# Patient Record
Sex: MALE | Race: WHITE | NOT HISPANIC OR LATINO | Employment: FULL TIME | URBAN - METROPOLITAN AREA
[De-identification: names, ages, dates, MRNs, and addresses within clinical notes are randomized per-mention and may not be internally consistent; named-entity substitution may affect disease eponyms.]

---

## 2017-10-23 ENCOUNTER — TRANSFERRED RECORDS (OUTPATIENT)
Dept: MULTI SPECIALTY CLINIC | Facility: CLINIC | Age: 52
End: 2017-10-23

## 2021-05-21 ENCOUNTER — OFFICE VISIT (OUTPATIENT)
Dept: FAMILY MEDICINE | Facility: CLINIC | Age: 56
End: 2021-05-21
Payer: COMMERCIAL

## 2021-05-21 VITALS
HEART RATE: 90 BPM | DIASTOLIC BLOOD PRESSURE: 78 MMHG | BODY MASS INDEX: 29.35 KG/M2 | OXYGEN SATURATION: 97 % | HEIGHT: 70 IN | WEIGHT: 205 LBS | TEMPERATURE: 98.2 F | SYSTOLIC BLOOD PRESSURE: 131 MMHG

## 2021-05-21 DIAGNOSIS — R79.89 LOW TESTOSTERONE: ICD-10-CM

## 2021-05-21 DIAGNOSIS — B00.1 HERPES LABIALIS: ICD-10-CM

## 2021-05-21 DIAGNOSIS — N40.0 BENIGN PROSTATIC HYPERPLASIA, UNSPECIFIED WHETHER LOWER URINARY TRACT SYMPTOMS PRESENT: ICD-10-CM

## 2021-05-21 DIAGNOSIS — L40.50 PSORIATIC ARTHRITIS (H): Primary | ICD-10-CM

## 2021-05-21 DIAGNOSIS — G47.00 INSOMNIA, UNSPECIFIED TYPE: ICD-10-CM

## 2021-05-21 DIAGNOSIS — G89.4 CHRONIC PAIN SYNDROME: ICD-10-CM

## 2021-05-21 PROCEDURE — 99203 OFFICE O/P NEW LOW 30 MIN: CPT | Performed by: FAMILY MEDICINE

## 2021-05-21 RX ORDER — TAMSULOSIN HYDROCHLORIDE 0.4 MG/1
CAPSULE ORAL
COMMUNITY

## 2021-05-21 RX ORDER — ASPIRIN 81 MG/1
81 TABLET ORAL DAILY
COMMUNITY

## 2021-05-21 RX ORDER — VALACYCLOVIR HYDROCHLORIDE 500 MG/1
TABLET, FILM COATED ORAL
COMMUNITY

## 2021-05-21 RX ORDER — TESTOSTERONE CYPIONATE 200 MG/ML
INJECTION, SOLUTION INTRAMUSCULAR
COMMUNITY

## 2021-05-21 RX ORDER — HYDROCODONE BITARTRATE AND ACETAMINOPHEN 5; 325 MG/1; MG/1
TABLET ORAL
COMMUNITY

## 2021-05-21 RX ORDER — LIDOCAINE 50 MG/G
PATCH TOPICAL
COMMUNITY

## 2021-05-21 RX ORDER — LORAZEPAM 0.5 MG/1
TABLET ORAL
COMMUNITY
End: 2023-08-25

## 2021-05-21 ASSESSMENT — MIFFLIN-ST. JEOR: SCORE: 1754.25

## 2021-05-21 NOTE — Clinical Note
Patient had a normal colonoscopy on October 23 of 2017 with results in his chart from [facility].  Repeat exam was recommended in 10 years. Please place in [facility].

## 2022-05-19 NOTE — PROGRESS NOTES
Assessment & Plan       ICD-10-CM    1. Psoriatic arthritis (H)  L40.50    2. Chronic pain syndrome  G89.4    3. Insomnia, unspecified type  G47.00    4. Low testosterone  R79.89    5. Benign prostatic hyperplasia, unspecified whether lower urinary tract symptoms present  N40.0    6. Herpes labialis  B00.1      We discussed his health conditions above  Discussed that he is on three different controlled substances including hydrocodone, lorazepam, and testosterone  I told him that I did not like to prescribe chronic opioids for people with musculoskeletal pain and especially did not like the combination of an opioid and benzodiazepine together  I also typically have urology manage a low testosterone situation and the testosterone injections  He apparently has been getting these medicines from his PCP and would like to continue to have a PCP manage these, so we decided he would likely seek out care from a different PCP    Return for a follow up as needed/desired.    Dean Unger MD  [facility] KODY Brennan is a 56 year old who presents for the following health issues     HPI     New Patient/Transfer of Care.    The patient was living in [place] and had a primary care provider and her rheumatologist there.  He lives in [place] now and his PCP practices out of [place], so he thought he would try to find a closer PCP.  He relates a history of psoriatic arthritis.  He sees a rheumatologist for that and is on Enbrel injections.  He has chronic pain and takes Norco three times a day for that.  He has had trouble sleeping and was on trazodone for a while, but that caused some hangover side effects, so he has been taking lorazepam at night to help with sleep instead.  He is on testosterone injections for a history of low testosterone.  He takes valacyclovir for cold sores and tamsulosin for urinary obstructive symptoms.    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 Significant observations if any: none. Removal took a short amount of time  and was not difficult at all.  Patient tolerated the procedure well.   "amount of physical labor that wears him out and causes pain.    Patient Active Problem List   Diagnosis     Psoriatic arthritis (H)     Chronic pain syndrome     Insomnia, unspecified type     Low testosterone     Benign prostatic hyperplasia, unspecified whether lower urinary tract symptoms present     Herpes labialis      Current Outpatient Medications   Medication     aspirin 81 MG EC tablet     etanercept (ENBREL SURECLICK) 50 MG/ML autoinjector     HYDROcodone-acetaminophen (NORCO) 5-325 MG tablet     lidocaine (LIDODERM) 5 % patch     LORazepam (ATIVAN) 0.5 MG tablet     LUER LOCK SAFETY SYRINGES 25G X 1\" 3 ML MISC     tamsulosin (FLOMAX) 0.4 MG capsule     testosterone cypionate (DEPOTESTOSTERONE) 200 MG/ML injection     valACYclovir (VALTREX) 500 MG tablet     No current facility-administered medications for this visit.          Review of Systems   Mainly significant for the above.      Objective    /78 (BP Location: Right arm, Patient Position: Sitting, Cuff Size: Adult Regular)   Pulse 90   Temp 98.2  F (36.8  C) (Oral)   Ht 1.775 m (5' 9.88\")   Wt 93 kg (205 lb)   SpO2 97%   BMI 29.51 kg/m    Body mass index is 29.51 kg/m .  Physical Exam   GENERAL: alert, no distress and over weight  MS: he is missing part of the fingers of his left hand    Old records were reviewed that we have on him from care in the [facility], but we do not have records from the [facility]        "

## 2023-07-10 ENCOUNTER — TRANSFERRED RECORDS (OUTPATIENT)
Dept: HEALTH INFORMATION MANAGEMENT | Facility: CLINIC | Age: 58
End: 2023-07-10
Payer: COMMERCIAL

## 2023-07-10 LAB
ALT SERPL-CCNC: <7 U/L (ref 18–65)
AST SERPL-CCNC: <8 U/L (ref 10–40)
CHOLESTEROL (EXTERNAL): 144 MG/DL
CREATININE (EXTERNAL): 0.81 MG/DL (ref 0.8–1.5)
GFR ESTIMATED (EXTERNAL): >90 ML/MIN/1.73M2
GLUCOSE (EXTERNAL): 99 MG/DL (ref 60–99)
HDLC SERPL-MCNC: 49 MG/DL
LDL CHOLESTEROL CALCULATED (EXTERNAL): 74 MG/DL
NON HDL CHOLESTEROL (EXTERNAL): 95 MG/DL
POTASSIUM (EXTERNAL): 4.3 MMOL/L (ref 3.5–5.1)
TRIGLYCERIDES (EXTERNAL): 105 MG/DL

## 2023-07-31 ENCOUNTER — MEDICAL CORRESPONDENCE (OUTPATIENT)
Dept: HEALTH INFORMATION MANAGEMENT | Facility: CLINIC | Age: 58
End: 2023-07-31
Payer: COMMERCIAL

## 2023-08-02 ENCOUNTER — TRANSCRIBE ORDERS (OUTPATIENT)
Dept: OTHER | Age: 58
End: 2023-08-02

## 2023-08-02 DIAGNOSIS — R79.89 OTHER SPECIFIED ABNORMAL FINDINGS OF BLOOD CHEMISTRY: ICD-10-CM

## 2023-08-02 DIAGNOSIS — R93.2 ABNORMAL FINDINGS ON DIAGNOSTIC IMAGING OF LIVER AND BILIARY TRACT: Primary | ICD-10-CM

## 2023-08-02 DIAGNOSIS — R17 UNSPECIFIED JAUNDICE: ICD-10-CM

## 2023-08-04 NOTE — CONFIDENTIAL NOTE
DIAGNOSIS:    Abnormal findings on diagnostic imaging of liver and biliary tract   Other specified abnormal findings of blood chemistry   Unspecified jaundice      Appt Date:  09.29.2023   NOTES STATUS DETAILS   OFFICE NOTE from referring provider Received 08.03.2023 JOSE A Connell CNP    OFFICE NOTES from other specialists     DISCHARGE SUMMARY from hospital     MEDICATION LIST Care Everywhere    LIVER BIOSPY (IF APPLICABLE)      PATHOLOGY REPORTS      IMAGING     ENDOSCOPY (IF AVAILABLE)     COLONOSCOPY (IF AVAILABLE)     ULTRASOUND LIVER     CT OF ABDOMEN     MRI OF LIVER     FIBROSCAN, US ELASTOGRAPHY, FIBROSIS SCAN, MR ELASTOGRAPHY     LABS     HEPATIC PANEL (LIVER PANEL) Received 07.10.2023   BASIC METABOLIC PANEL Received 07.10.2023   COMPLETE METABOLIC PANEL Received 07.10.2023   COMPLETE BLOOD COUNT (CBC) Received 07.10.2023   INTERNATIONAL NORMALIZED RATIO (INR)     HEPATITIS C ANTIBODY     HEPATITIS C VIRAL LOAD/PCR     HEPATITIS C GENOTYPE     HEPATITIS B SURFACE ANTIGEN     HEPATITIS B SURFACE ANTIBODY     HEPATITIS B DNA QUANT LEVEL     HEPATITIS B CORE ANTIBODY

## 2023-08-06 ENCOUNTER — HEALTH MAINTENANCE LETTER (OUTPATIENT)
Age: 58
End: 2023-08-06

## 2023-08-25 ENCOUNTER — VIRTUAL VISIT (OUTPATIENT)
Dept: GASTROENTEROLOGY | Facility: CLINIC | Age: 58
End: 2023-08-25
Attending: STUDENT IN AN ORGANIZED HEALTH CARE EDUCATION/TRAINING PROGRAM
Payer: COMMERCIAL

## 2023-08-25 DIAGNOSIS — R93.2 ABNORMAL FINDINGS ON DIAGNOSTIC IMAGING OF LIVER AND BILIARY TRACT: ICD-10-CM

## 2023-08-25 DIAGNOSIS — R79.89 OTHER SPECIFIED ABNORMAL FINDINGS OF BLOOD CHEMISTRY: ICD-10-CM

## 2023-08-25 DIAGNOSIS — R17 UNSPECIFIED JAUNDICE: ICD-10-CM

## 2023-08-25 PROCEDURE — 99204 OFFICE O/P NEW MOD 45 MIN: CPT | Mod: VID | Performed by: STUDENT IN AN ORGANIZED HEALTH CARE EDUCATION/TRAINING PROGRAM

## 2023-08-25 RX ORDER — ATORVASTATIN CALCIUM 20 MG/1
20 TABLET, FILM COATED ORAL DAILY
COMMUNITY
Start: 2023-07-17

## 2023-08-25 NOTE — NURSING NOTE
Is the patient currently in the state of [place]? YES    Visit mode:VIDEO    If the visit is dropped, the patient can be reconnected by: VIDEO VISIT: Send to e-mail at: [email or web address]    Will anyone else be joining the visit? NO  (If patient encounters technical issues they should call [phone]    How would you like to obtain your AVS? MyChart    Are changes needed to the allergy or medication list? No    Reason for visit: Consult    Clayton BREWER

## 2023-08-25 NOTE — PROGRESS NOTES
"Virtual Visit Details    Type of service:  Video Visit     Originating Location (pt. Location): Home    Distant Location (provider location):  Off-site  Platform used for Video Visit: Den  Start Time 9:01 AM  End Time 9:28 AM    [facility] New Patient Visit    Date of Visit: August 25, 2023    Reason for referral: ?jaundice, abnormal liver imaging    Subjective: Mr. Reeder is a 58 year old man with a history of psoriatic arthritis who presents for evaluation of ? Elevated LFTs and cirrhosis. Unfortunately, I have no records for review.     He had an ultrasound that showed \"fatty liver\" and maybe scarring. He had labs that were apparently better than expected given the US findings    Currently weighs 205 lbs, has been around this for awhile, but did gain weight in 2021 when going through personal stressors    Used to be a heavy drinker, but has not drank for 30 years.     No previous known liver disease, abnormal LFTs, imaging tests. No known history of liver decompensation    On PO methotrexate for 1-2 months in the past.     Thinks hepatitis C and B testing was done    ROS: 14 point ROS negative except for positives noted in HPI.    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  Bunionectomy  Appendectomy    FamHx:  No family history of liver disease, liver cancer    SocHx:  Social History     Socioeconomic History    Marital status:      Spouse name: Not on file    Number of children: Not on file    Years of education: Not on file    Highest education level: Not on file   Occupational History    Not on file   Tobacco Use    Smoking status: Former    Smokeless tobacco: Never   Substance and Sexual Activity    Alcohol use: Not Currently    Drug use: Not Currently    Sexual activity: Yes     Partners: Female   Other Topics Concern    Not " "on file   Social History Narrative    Not on file     Social Determinants of Health     Financial Resource Strain: Not on file   Food Insecurity: Not on file   Transportation Needs: Not on file   Physical Activity: Not on file   Stress: Not on file   Social Connections: Not on file   Intimate Partner Violence: Not on file   Housing Stability: Not on file   Smokes tobacco    Medications:  Current Outpatient Medications   Medication    aspirin 81 MG EC tablet    etanercept (ENBREL SURECLICK) 50 MG/ML autoinjector    HYDROcodone-acetaminophen (NORCO) 5-325 MG tablet    lidocaine (LIDODERM) 5 % patch    LORazepam (ATIVAN) 0.5 MG tablet    LUER LOCK SAFETY SYRINGES 25G X 1\" 3 ML MISC    tamsulosin (FLOMAX) 0.4 MG capsule    testosterone cypionate (DEPOTESTOSTERONE) 200 MG/ML injection    valACYclovir (VALTREX) 500 MG tablet     No current facility-administered medications for this visit.     No OTCs, herbals    Allergies:  No Known Allergies    Objective:  There were no vitals taken for this visit.  Constitutional: pleasant man in NAD  Eyes: non icteric  Respiratory: Normal respiratory excursion   MSK: normal range of motion of visualized extremities  Abd: Non distended  Skin: No jaundice  Psychiatric: normal mood and orientation    Labs:    No recent labs for review    Platelets 275    Imaging: None for review    Independently reviewed labs and imaging.     Assessment/Plan: Mr. Reeder is a 58 year old man with a history of psoriatic arthritis who presents for evaluation of apparent liver scarring and elevated LFTs. Unfortunately, I do not have any records for review.     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  Discussed this is likely secondary to metabolic associated steatotic liver disease.  Treatment for this is weight loss to reduce liver inflammation.  In patients who have cirrhosis, it is unlikely the fibrosis will regress with weight loss.  He does not drink alcohol " much, recommend he completely abstain.  We will make sure he has had work-up for other causes of chronic liver disease, including autoimmune etiologies given his psoriatic arthritis.  Discussed that patients with cirrhosis need liver cancer screening with ultrasound and labs every 6 months.  Also recommend upper endoscopy for variceal screening if his imaging is consistent with cirrhosis.  Do not think his Enbrel is related to his underlying liver issues.      RTC 6 months.    Payton Moore MD MS  Hepatology/Liver Transplant  [facility]

## 2023-08-25 NOTE — LETTER
"    8/25/2023         RE: Mika Reeder  [address] [address]        Dear Colleague,    Thank you for referring your patient, Mika Reeder, to the [facility] [place]. Please see a copy of my visit note below.    Virtual Visit Details    Type of service:  Video Visit     Originating Location (pt. Location): Home    Distant Location (provider location):  Off-site  Platform used for Video Visit: [employer]  Start Time 9:01 AM  End Time 9:28 AM    [facility] New Patient Visit    Date of Visit: August 25, 2023    Reason for referral: ?jaundice, abnormal liver imaging    Subjective: Mr. Reeder is a 58 year old man with a history of psoriatic arthritis who presents for evaluation of ? Elevated LFTs and cirrhosis. Unfortunately, I have no records for review.     He had an ultrasound that showed \"fatty liver\" and maybe scarring. He had labs that were apparently better than expected given the US findings    Currently weighs 205 lbs, has been around this for awhile, but did gain weight in 2021 when going through personal stressors    Used to be a heavy drinker, but has not drank for 30 years.     No previous known liver disease, abnormal LFTs, imaging tests. No known history of liver decompensation    On PO methotrexate for 1-2 months in the past.     Thinks hepatitis C and B testing was done    ROS: 14 point ROS negative except for positives noted in HPI.    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  Bunionectomy  Appendectomy    FamHx:  No family history of liver disease, liver cancer    SocHx:  Social History     Socioeconomic History    Marital status:      Spouse name: Not on file    Number of children: Not on file    Years of education: Not on file    Highest education level: Not on file " "  Occupational History    Not on file   Tobacco Use    Smoking status: Former    Smokeless tobacco: Never   Substance and Sexual Activity    Alcohol use: Not Currently    Drug use: Not Currently    Sexual activity: Yes     Partners: Female   Other Topics Concern    Not on file   Social History Narrative    Not on file     Social Determinants of Health     Financial Resource Strain: Not on file   Food Insecurity: Not on file   Transportation Needs: Not on file   Physical Activity: Not on file   Stress: Not on file   Social Connections: Not on file   Intimate Partner Violence: Not on file   Housing Stability: Not on file   Smokes tobacco    Medications:  Current Outpatient Medications   Medication    aspirin 81 MG EC tablet    etanercept (ENBREL SURECLICK) 50 MG/ML autoinjector    HYDROcodone-acetaminophen (NORCO) 5-325 MG tablet    lidocaine (LIDODERM) 5 % patch    LORazepam (ATIVAN) 0.5 MG tablet    LUER LOCK SAFETY SYRINGES 25G X 1\" 3 ML MISC    tamsulosin (FLOMAX) 0.4 MG capsule    testosterone cypionate (DEPOTESTOSTERONE) 200 MG/ML injection    valACYclovir (VALTREX) 500 MG tablet     No current facility-administered medications for this visit.     No OTCs, herbals    Allergies:  No Known Allergies    Objective:  There were no vitals taken for this visit.  Constitutional: pleasant man in NAD  Eyes: non icteric  Respiratory: Normal respiratory excursion   MSK: normal range of motion of visualized extremities  Abd: Non distended  Skin: No jaundice  Psychiatric: normal mood and orientation    Labs:    No recent labs for review    Platelets 275    Imaging: None for review    Independently reviewed labs and imaging.     Assessment/Plan: Mr. Reeder is a 58 year old man with a history of psoriatic arthritis who presents for evaluation of apparent liver scarring and elevated LFTs. Unfortunately, I do not have any records for review.     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  Discussed this is likely secondary to metabolic associated steatotic liver disease.  Treatment for this is weight loss to reduce liver inflammation.  In patients who have cirrhosis, it is unlikely the fibrosis will regress with weight loss.  He does not drink alcohol much, recommend he completely abstain.  We will make sure he has had work-up for other causes of chronic liver disease, including autoimmune etiologies given his psoriatic arthritis.  Discussed that patients with cirrhosis need liver cancer screening with ultrasound and labs every 6 months.  Also recommend upper endoscopy for variceal screening if his imaging is consistent with cirrhosis.  Do not think his Enbrel is related to his underlying liver issues.      RTC 6 months.    Payton Moore MD MS  Hepatology/Liver Transplant  [facility]

## 2023-09-11 ENCOUNTER — TRANSFERRED RECORDS (OUTPATIENT)
Dept: HEALTH INFORMATION MANAGEMENT | Facility: CLINIC | Age: 58
End: 2023-09-11
Payer: COMMERCIAL

## 2023-09-11 LAB
ALT SERPL-CCNC: 36 U/L (ref 18–65)
CREATININE (EXTERNAL): 0.7 MG/DL (ref 0.8–1.5)
GFR ESTIMATED (EXTERNAL): >90 ML/MIN/1.73M2
GLUCOSE (EXTERNAL): 103 MG/DL (ref 60–99)
POTASSIUM (EXTERNAL): 4.8 MMOL/L (ref 3.5–5.1)

## 2023-09-29 ENCOUNTER — PRE VISIT (OUTPATIENT)
Dept: GASTROENTEROLOGY | Facility: CLINIC | Age: 58
End: 2023-09-29
Payer: COMMERCIAL

## 2023-12-22 ENCOUNTER — TRANSFERRED RECORDS (OUTPATIENT)
Dept: HEALTH INFORMATION MANAGEMENT | Facility: CLINIC | Age: 58
End: 2023-12-22

## 2024-02-27 DIAGNOSIS — R93.2 ABNORMAL FINDINGS ON DIAGNOSTIC IMAGING OF LIVER AND BILIARY TRACT: Primary | ICD-10-CM

## 2024-06-24 ENCOUNTER — TRANSFERRED RECORDS (OUTPATIENT)
Dept: HEALTH INFORMATION MANAGEMENT | Facility: CLINIC | Age: 59
End: 2024-06-24

## 2024-09-29 ENCOUNTER — HEALTH MAINTENANCE LETTER (OUTPATIENT)
Age: 59
End: 2024-09-29

## 2024-12-02 ENCOUNTER — TRANSFERRED RECORDS (OUTPATIENT)
Dept: HEALTH INFORMATION MANAGEMENT | Facility: CLINIC | Age: 59
End: 2024-12-02
Payer: COMMERCIAL

## 2024-12-04 ENCOUNTER — TRANSCRIBE ORDERS (OUTPATIENT)
Dept: OTHER | Age: 59
End: 2024-12-04

## 2024-12-04 DIAGNOSIS — L40.50 PSORIATIC ARTHRITIS (H): Primary | ICD-10-CM

## 2024-12-05 ENCOUNTER — TELEPHONE (OUTPATIENT)
Dept: RHEUMATOLOGY | Facility: CLINIC | Age: 59
End: 2024-12-05
Payer: COMMERCIAL

## 2024-12-05 DIAGNOSIS — L40.50 PSORIATIC ARTHRITIS (H): Primary | ICD-10-CM

## 2024-12-05 NOTE — TELEPHONE ENCOUNTER
Health Call Center    Phone Message    May a detailed message be left on voicemail: yes     Reason for Call: Medication Refill Request    Has the patient contacted the pharmacy for the refill? Yes   Name of medication being requested: Enbrel  Provider who prescribed the medication: Sharif  Pharmacy: [facility]  Date medication is needed: ASAP    Pt is scheduled to establish care with Dr. Olguin 2/12/25, however, his previous rheumatologist was also Dr. Olguin (pt was seeing him at [facility] and is now following him to [facility]). Pt is hoping Dr. Olguin would be able/willing to fill this for him? Says he has been out of medication for some time now ever since Dr. Olguin left.   The last set of lab tests that are visible in pt's chart are from 9/11/23, pt reports he had repeat lab tests done approximately 6 months after that. He will contact [facility] today to have them send over his most recent lab results.      Action Taken: Other: Rheum    Travel Screening: Not Applicable

## 2024-12-05 NOTE — TELEPHONE ENCOUNTER
Rx refilled per Rheum RN refill protocol.     RN reviewed labs from [facility] and [facility]. Ok to send rx per MD.

## 2024-12-05 NOTE — TELEPHONE ENCOUNTER
Per pt rx should go to [facility] specialty pharmacy. [phone]

## 2025-01-28 ENCOUNTER — TELEPHONE (OUTPATIENT)
Dept: RHEUMATOLOGY | Facility: CLINIC | Age: 60
End: 2025-01-28
Payer: COMMERCIAL

## 2025-01-28 NOTE — TELEPHONE ENCOUNTER
[facility]    Phone Message    May a detailed message be left on voicemail: yes     Reason for Call: Other: .     Patient states he is wondering if the clinic has any samples of etanercept (ENBREL SURECLICK) 50 MG/ML autoinjector for patient to have. Patient states he has been without and cannot get any at the pharmacy. Patient states he is wanting 4 samples to help get him to his upcoming appt with Dr. Olguin. Patient is wanting to get a call back. Please advise.     Action Taken: Message routed to:  [facility] Rheum    Travel Screening: Not Applicable     Date of Service:

## 2025-01-28 NOTE — TELEPHONE ENCOUNTER
Spoke to pharmacy- pharmacy states they have the prescription on file with refill left. Pt needs to call and schedule.   Having trouble with the copay assistance- member ID not found.     Lm for pt informing clinic does not carry samples. Pt's pharmacy does have the prescription on file, however they are having issues with the copay assistance as it is stating member ID not found. Pt's primary insurance is running through fine. Pt has a copay though.

## 2025-02-12 ENCOUNTER — OFFICE VISIT (OUTPATIENT)
Dept: RHEUMATOLOGY | Facility: CLINIC | Age: 60
End: 2025-02-12
Payer: COMMERCIAL

## 2025-02-12 ENCOUNTER — LAB (OUTPATIENT)
Dept: LAB | Facility: CLINIC | Age: 60
End: 2025-02-12
Payer: COMMERCIAL

## 2025-02-12 VITALS
SYSTOLIC BLOOD PRESSURE: 116 MMHG | BODY MASS INDEX: 30.95 KG/M2 | WEIGHT: 216.2 LBS | OXYGEN SATURATION: 94 % | HEART RATE: 84 BPM | HEIGHT: 70 IN | DIASTOLIC BLOOD PRESSURE: 81 MMHG

## 2025-02-12 DIAGNOSIS — Z79.899 HIGH RISK MEDICATION USE: Primary | ICD-10-CM

## 2025-02-12 DIAGNOSIS — L40.50 PSORIATIC ARTHRITIS (H): ICD-10-CM

## 2025-02-12 DIAGNOSIS — Z79.899 HIGH RISK MEDICATION USE: ICD-10-CM

## 2025-02-12 LAB
ALT SERPL W P-5'-P-CCNC: 42 U/L (ref 0–70)
CREAT SERPL-MCNC: 0.94 MG/DL (ref 0.67–1.17)
EGFRCR SERPLBLD CKD-EPI 2021: >90 ML/MIN/1.73M2
ERYTHROCYTE [DISTWIDTH] IN BLOOD BY AUTOMATED COUNT: 12.9 % (ref 10–15)
HCT VFR BLD AUTO: 47.3 % (ref 40–53)
HGB BLD-MCNC: 16.3 G/DL (ref 13.3–17.7)
MCH RBC QN AUTO: 31.2 PG (ref 26.5–33)
MCHC RBC AUTO-ENTMCNC: 34.5 G/DL (ref 31.5–36.5)
MCV RBC AUTO: 90 FL (ref 78–100)
PLATELET # BLD AUTO: 241 10E3/UL (ref 150–450)
RBC # BLD AUTO: 5.23 10E6/UL (ref 4.4–5.9)
WBC # BLD AUTO: 5 10E3/UL (ref 4–11)

## 2025-02-12 PROCEDURE — 85041 AUTOMATED RBC COUNT: CPT

## 2025-02-12 PROCEDURE — 82565 ASSAY OF CREATININE: CPT

## 2025-02-12 PROCEDURE — 84460 ALANINE AMINO (ALT) (SGPT): CPT

## 2025-02-12 PROCEDURE — 36415 COLL VENOUS BLD VENIPUNCTURE: CPT

## 2025-02-12 RX ORDER — TADALAFIL 5 MG/1
5 TABLET, FILM COATED ORAL DAILY
COMMUNITY
Start: 2024-08-02

## 2025-02-12 NOTE — PROGRESS NOTES
This document was created using a software with less than 100% fidelity, at times resulting in unintended, even erroneous syntax and grammar.  The reader is advised to keep this under consideration while reviewing, interpreting this note.      Rheumatology Consult Note      Mika Reeder     YOB: 1965 Age: 59 year old    Date of visit: 2/12/25    PCP: Halina Moreno    Chief Complaint   Follow-up for psoriatic arthritis          Assessment and Plan     Psoriatic arthritis.  The patient has been known to me from my previous practice in [place], he has done very well on Enbrel for a number of years.  Due to the new year and change in co-pay policies, he is having some difficulty affording the out-of-pocket co-pay due on Enbrel.  For a number of years he has used the Enbrel co-pay assistance card but due to some policy changes it appears his health insurance would not honor the co-pay card.  We will send a prescription for weekly Enbrel injections (SureClick 50 mg) to [facility] pharmacy, hopefully are staff can help him troubleshoot through the co-pay issues.  If he is unable to go on Enbrel, we will switch him to Tremfya which has a free drug policy for patients whose insurance would decline Tremfya for psoriatic arthritis.  We also discussed some of the other available treatment options for psoriatic arthritis including Taltz, Cosentyx, Skyrizi.  Rinvoq and Xeljanz also discussed but these medications can slightly increase the risk of strokes and heart attacks in patients above 50 so we will try to avoid them.    Total time spent 36 minutes including review of records, and face-to-face communication, exploring his different treatment options, sending prescriptions and documentation.    Follow-up 1 year    CC JOSE A Dunne   Silverio is a 59-year-old gentleman who has been my patient for more than 10 years, I used to follow him for psoriatic arthritis in [place].  He has been on Enbrel  "for more than 10 years and his psoriatic arthritis has done well on Enbrel weekly injections.  He tried Humira but it was less effective than Enbrel.  He was a nonresponder to methotrexate.  Since January 2025 he has not been able to get his Enbrel supply because of out-of-pocket co-pay issues.  We will resubmit a prescription for Enbrel and hopefully these issues can be resolved.  If Enbrel is unaffordable for him his other option would be to apply for Tremfya, which if denied by insurance he would be eligible for 2-year free drug supply.    His  screening for TB, hepatitis B and C was normal.    We will update his monitoring labs including CBC, ALT and creatinine today.    He denies any other major change in his his health or medications.  He still follows with his PCP in [place].             Active Problem List     Patient Active Problem List   Diagnosis    Psoriatic arthritis (H)    Chronic pain syndrome    Insomnia, unspecified type    Low testosterone    Benign prostatic hyperplasia, unspecified whether lower urinary tract symptoms present    Herpes labialis     Past Medical History   No past medical history on file.  Past Surgical History   No past surgical history on file.  Allergy   No Known Allergies  Current Medication List     Current Outpatient Medications   Medication Sig Dispense Refill    aspirin 81 MG EC tablet Take 81 mg by mouth daily      atorvastatin (LIPITOR) 20 MG tablet Take 20 mg by mouth daily      CIALIS 5 MG tablet Take 5 mg by mouth daily.      HYDROcodone-acetaminophen (NORCO) 5-325 MG tablet hydrocodone 5 mg-acetaminophen 325 mg tablet   TAKE 1 TABLET BY MOUTH EVERY 6 HOURS      lidocaine (LIDODERM) 5 % patch lidocaine 5 % topical patch      LUER LOCK SAFETY SYRINGES 25G X 1\" 3 ML MISC As directed, use to inject testosterone      tamsulosin (FLOMAX) 0.4 MG capsule tamsulosin 0.4 mg capsule      testosterone cypionate (DEPOTESTOSTERONE) 200 MG/ML injection " "testosterone cypionate 200 mg/mL intramuscular oil      valACYclovir (VALTREX) 500 MG tablet valacyclovir 500 mg tablet      etanercept (ENBREL SURECLICK) 50 MG/ML autoinjector Inject 50 mg subcutaneously every 7 days. (Patient not taking: Reported on 2/12/2025) 4 mL 2    etanercept (ENBREL SURECLICK) 50 MG/ML autoinjector once a week (Patient not taking: Reported on 2/12/2025)       No current facility-administered medications for this visit.       No current facility-administered medications for this visit.        Family History   No family history on file.      Physical Exam     COMPREHENSIVE EXAMINATION:  Vitals:    02/12/25 0802   BP: 116/81   BP Location: Right arm   Patient Position: Sitting   Cuff Size: Adult Large   Pulse: 84   SpO2: 94%   Weight: 98.1 kg (216 lb 3.2 oz)   Height: 1.775 m (5' 9.88\")   Patient is alert and oriented x 3.    Head/neck: No facial asymmetry, no rash, no jaundice, no conjunctival pallor, no alopecia    Lungs clear    Heart sounds regular    Left hand.  No swelling or synovitis of the fingers, no tenderness of individual MCP, PIP and DIP knuckles    Right hand.  No swelling or synovitis of the fingers noted, no tenderness of individual MCP, PIP and DIP knuckles.    Elbows normal range of motion    Shoulders normal ROM    Hips pain-free ROM    Knees normal range of motion    Ankles no swelling or redness            Labs / Imaging (select studies)     No results found for: \"PPDINDURATIO\", \"PPDREDNESS\", \"TBGOLT\", \"RHF\", \"CCPABG\", \"URIC\", \"JT36699\", \"IB119572\", \"ANTIDNA\", \"ANTIDNAINT\", \"CARIA\", \"HM51714\", \"UF719577\", \"ENASM\", \"ENASCL\", \"JO1\", \"ENASSA\", \"ENASSB\", \"CENTA\", \"F2DYWAR\", \"I9AZUXW\", \"OL6355\"   No results found for: \"CCPT\", \"RF8\", \"HGB\", \"BUN\", \"CREAT\", \"SED\", \"CRP\", \"AST\", \"CARIA\", \"ANCA\", \"OZ4141\", \"ALBUMIN\", \"ALKPHOS\", \"ALT\", \"[id number]\", \"[id number]\", \"[id number]\", \"RHF\"       Immunization History     Immunization History   Administered Date(s) Administered    COVID-19 " Bivalent 12+ (Pfizer) 11/05/2022    COVID-19 MONOVALENT 12+ (Pfizer) 10/30/2021    COVID-19 Vaccine (Yamile) 03/23/2021    Influenza Vaccine 18-64 (Flublok) 10/31/2019    Influenza Vaccine >6 months,quad, PF 09/29/2020    Influenza,INJ,MDCK,PF,Quad >6mo(Flucelvax) 02/20/2019    Pneumococcal 23 valent 06/30/2011    TDAP Vaccine (Adacel) 05/08/2017    Zoster recombinant adjuvanted (SHINGRIX) 01/09/2020, 04/04/2020

## 2025-05-20 ENCOUNTER — TELEPHONE (OUTPATIENT)
Dept: RHEUMATOLOGY | Facility: CLINIC | Age: 60
End: 2025-05-20
Payer: COMMERCIAL

## 2025-05-20 NOTE — TELEPHONE ENCOUNTER
PA Initiation    Medication: ENBREL SURECLICK 50 MG/ML SC SOAJ  Insurance Company: [employer] [facility] Special[facility] Prior Auth Dept, phone  [phone], Fax [phone]  Pharmacy Filling the Rx:    Filling Pharmacy Phone:    Filling Pharmacy Fax:    Start Date: 5/20/2025

## 2025-05-21 NOTE — TELEPHONE ENCOUNTER
Prior Authorization Approval-Proactive Renewal    Medication: ENBREL SURECLICK 50 MG/ML SC SOAJ  Authorization Effective Date: 5/20/2025  Authorization Expiration Date: 5/20/2026  Approved Dose/Quantity:   Reference #: Key: [id number]   Insurance Company: [employer] [facility] Prior Auth Dept, phone  [phone], Fax [phone]  Expected CoPay: $    CoPay Card Available:      Financial Assistance Needed:   Which Pharmacy is filling the prescription:

## 2025-05-27 ENCOUNTER — TELEPHONE (OUTPATIENT)
Dept: RHEUMATOLOGY | Facility: CLINIC | Age: 60
End: 2025-05-27
Payer: COMMERCIAL

## 2025-05-27 NOTE — TELEPHONE ENCOUNTER
PA Initiation    Medication: SKYRIZI  MG/ML SC SOAJ  Insurance Company: [facility] Specialty Prior Auth Dept, phone  [phone], Fax [phone]  Pharmacy Filling the Rx:    Filling Pharmacy Phone:    Filling Pharmacy Fax:    Start Date: 5/27/2025

## 2025-05-28 NOTE — TELEPHONE ENCOUNTER
Prior Authorization Approval    Medication: SKYRIZI  MG/ML SC SOAJ  Authorization Effective Date: 5/27/2025  Authorization Expiration Date: 5/27/2026  Approved Dose/Quantity:   Reference #: Key: [id number]   Insurance Company: [facility] Prior Auth Dept, phone  [phone], Fax [phone]  Expected CoPay: $    CoPay Card Available:      Financial Assistance Needed:   Which Pharmacy is filling the prescription: [facility] - [place], [place] - [address] [place] [address]  Pharmacy Notified: yes  Patient Notified: yes, sent My Chart msg

## 2025-05-28 NOTE — TELEPHONE ENCOUNTER
Per Mari with [facility], they have recently approved prior auth but are only able to fill it through the [facility] specialty service if it could be called [phone]. Phone # for copy of prior auth if needed is [phone].